# Patient Record
Sex: MALE | Race: WHITE | HISPANIC OR LATINO
[De-identification: names, ages, dates, MRNs, and addresses within clinical notes are randomized per-mention and may not be internally consistent; named-entity substitution may affect disease eponyms.]

---

## 2018-10-19 ENCOUNTER — TRANSCRIPTION ENCOUNTER (OUTPATIENT)
Age: 33
End: 2018-10-19

## 2019-05-02 ENCOUNTER — APPOINTMENT (OUTPATIENT)
Dept: OBGYN | Facility: CLINIC | Age: 34
End: 2019-05-02

## 2019-07-03 ENCOUNTER — LABORATORY RESULT (OUTPATIENT)
Age: 34
End: 2019-07-03

## 2019-07-03 ENCOUNTER — APPOINTMENT (OUTPATIENT)
Dept: OBGYN | Facility: CLINIC | Age: 34
End: 2019-07-03
Payer: COMMERCIAL

## 2019-07-03 PROCEDURE — 36415 COLL VENOUS BLD VENIPUNCTURE: CPT

## 2020-06-11 ENCOUNTER — NON-APPOINTMENT (OUTPATIENT)
Age: 35
End: 2020-06-11

## 2020-06-11 ENCOUNTER — APPOINTMENT (OUTPATIENT)
Dept: FAMILY MEDICINE | Facility: CLINIC | Age: 35
End: 2020-06-11
Payer: COMMERCIAL

## 2020-06-11 VITALS
HEART RATE: 88 BPM | WEIGHT: 222 LBS | SYSTOLIC BLOOD PRESSURE: 128 MMHG | TEMPERATURE: 98.9 F | DIASTOLIC BLOOD PRESSURE: 90 MMHG | HEIGHT: 71 IN | BODY MASS INDEX: 31.08 KG/M2 | OXYGEN SATURATION: 98 %

## 2020-06-11 VITALS — DIASTOLIC BLOOD PRESSURE: 100 MMHG | SYSTOLIC BLOOD PRESSURE: 138 MMHG

## 2020-06-11 DIAGNOSIS — Z83.3 FAMILY HISTORY OF DIABETES MELLITUS: ICD-10-CM

## 2020-06-11 DIAGNOSIS — Z78.9 OTHER SPECIFIED HEALTH STATUS: ICD-10-CM

## 2020-06-11 DIAGNOSIS — Z20.828 CONTACT WITH AND (SUSPECTED) EXPOSURE TO OTHER VIRAL COMMUNICABLE DISEASES: ICD-10-CM

## 2020-06-11 DIAGNOSIS — Z13.21 ENCOUNTER FOR SCREENING FOR NUTRITIONAL DISORDER: ICD-10-CM

## 2020-06-11 DIAGNOSIS — Z80.9 FAMILY HISTORY OF MALIGNANT NEOPLASM, UNSPECIFIED: ICD-10-CM

## 2020-06-11 PROCEDURE — 93000 ELECTROCARDIOGRAM COMPLETE: CPT | Mod: 59

## 2020-06-11 PROCEDURE — 36415 COLL VENOUS BLD VENIPUNCTURE: CPT

## 2020-06-11 PROCEDURE — 99205 OFFICE O/P NEW HI 60 MIN: CPT | Mod: 25

## 2020-06-11 NOTE — HISTORY OF PRESENT ILLNESS
[de-identified] : NP CPE \par as above +ve FAST \par reports 2 yr hx of nocturnal sweating,  sweating through clothes awoken in middle of night,  pt frequently has an alcoholic beverage\par to help him sleep in middle of night.   Drinking "10 beers" at night after work to help feel asleep  \par Reports CP and/or SOB in am associated c increased stress/anxiety, mild dizziness in am secondary to "hang over" from increased ETOH intake at night no palpitations \par pt also c/o RUQ abdominal discomfort.  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

## 2020-06-11 NOTE — PHYSICAL EXAM
[No Acute Distress] : no acute distress [EOMI] : extraocular movements intact [Well Developed] : well developed [Well Nourished] : well nourished [No JVD] : no jugular venous distention [Normal Outer Ear/Nose] : the outer ears and nose were normal in appearance [No Respiratory Distress] : no respiratory distress  [No Accessory Muscle Use] : no accessory muscle use [Normal Rate] : normal rate  [Clear to Auscultation] : lungs were clear to auscultation bilaterally [Regular Rhythm] : with a regular rhythm [Normal S1, S2] : normal S1 and S2 [No Edema] : there was no peripheral edema [No CVA Tenderness] : no CVA  tenderness [Non-distended] : non-distended [No Rash] : no rash [Grossly Normal Strength/Tone] : grossly normal strength/tone [Coordination Grossly Intact] : coordination grossly intact [Normal Gait] : normal gait [Speech Grossly Normal] : speech grossly normal [Normal Affect] : the affect was normal [Normal Insight/Judgement] : insight and judgment were intact [de-identified] : tic like movements of head/face/neck

## 2020-06-11 NOTE — ASSESSMENT
[FreeTextEntry1] : EKG performed: SR at 83 bpm, no ST/T changes \par \par see lab orders \par see referral \par \par see med orders  (Trial of Lexapro 10mg qd ) \par f/u 4 weeks \par \par

## 2020-06-11 NOTE — REVIEW OF SYSTEMS
[Negative] : Heme/Lymph [FreeTextEntry2] : see HPI  [FreeTextEntry5] : see HPI  [FreeTextEntry6] : see HPI  [FreeTextEntry7] : see HPI  [de-identified] : see HPI

## 2020-06-12 LAB
25(OH)D3 SERPL-MCNC: 23.2 NG/ML
ALBUMIN SERPL ELPH-MCNC: 5.2 G/DL
ALP BLD-CCNC: 76 U/L
ALT SERPL-CCNC: 457 U/L
AMYLASE/CREAT SERPL: 51 U/L
ANION GAP SERPL CALC-SCNC: 18 MMOL/L
APPEARANCE: CLEAR
AST SERPL-CCNC: 238 U/L
BACTERIA: NEGATIVE
BASOPHILS # BLD AUTO: 0.06 K/UL
BASOPHILS NFR BLD AUTO: 0.7 %
BILIRUB SERPL-MCNC: 1 MG/DL
BILIRUBIN URINE: NEGATIVE
BLOOD URINE: NORMAL
BUN SERPL-MCNC: 15 MG/DL
CALCIUM SERPL-MCNC: 10.1 MG/DL
CHLORIDE SERPL-SCNC: 101 MMOL/L
CHOLEST SERPL-MCNC: 291 MG/DL
CHOLEST/HDLC SERPL: 5.5 RATIO
CO2 SERPL-SCNC: 21 MMOL/L
COLOR: YELLOW
CREAT SERPL-MCNC: 0.86 MG/DL
EOSINOPHIL # BLD AUTO: 0.25 K/UL
EOSINOPHIL NFR BLD AUTO: 3 %
ESTIMATED AVERAGE GLUCOSE: 88 MG/DL
GLUCOSE QUALITATIVE U: NEGATIVE
GLUCOSE SERPL-MCNC: 101 MG/DL
GRANULAR CASTS: 1 /LPF
HBA1C MFR BLD HPLC: 4.7 %
HCT VFR BLD CALC: 51.9 %
HDLC SERPL-MCNC: 53 MG/DL
HGB BLD-MCNC: 16.7 G/DL
HYALINE CASTS: 2 /LPF
IMM GRANULOCYTES NFR BLD AUTO: 0.2 %
KETONES URINE: ABNORMAL
LDLC SERPL CALC-MCNC: 207 MG/DL
LEUKOCYTE ESTERASE URINE: NEGATIVE
LPL SERPL-CCNC: 34 U/L
LYMPHOCYTES # BLD AUTO: 2.13 K/UL
LYMPHOCYTES NFR BLD AUTO: 26 %
MAN DIFF?: NORMAL
MCHC RBC-ENTMCNC: 32.2 GM/DL
MCHC RBC-ENTMCNC: 33.2 PG
MCV RBC AUTO: 103.2 FL
MICROSCOPIC-UA: NORMAL
MONOCYTES # BLD AUTO: 0.57 K/UL
MONOCYTES NFR BLD AUTO: 7 %
NEUTROPHILS # BLD AUTO: 5.17 K/UL
NEUTROPHILS NFR BLD AUTO: 63.1 %
NITRITE URINE: NEGATIVE
PH URINE: 5.5
PLATELET # BLD AUTO: 224 K/UL
POTASSIUM SERPL-SCNC: 4.2 MMOL/L
PROT SERPL-MCNC: 8 G/DL
PROTEIN URINE: ABNORMAL
RBC # BLD: 5.03 M/UL
RBC # FLD: 11.5 %
RED BLOOD CELLS URINE: 9 /HPF
SARS-COV-2 IGG SERPL IA-ACNC: 0.01 INDEX
SARS-COV-2 IGG SERPL QL IA: NEGATIVE
SODIUM SERPL-SCNC: 141 MMOL/L
SPECIFIC GRAVITY URINE: 1.03
SQUAMOUS EPITHELIAL CELLS: 1 /HPF
T3 SERPL-MCNC: 131 NG/DL
T4 SERPL-MCNC: 5.5 UG/DL
TRIGL SERPL-MCNC: 157 MG/DL
TSH SERPL-ACNC: 1.4 UIU/ML
URATE SERPL-MCNC: 7.3 MG/DL
UROBILINOGEN URINE: NORMAL
WBC # FLD AUTO: 8.2 K/UL
WHITE BLOOD CELLS URINE: 2 /HPF

## 2020-06-16 LAB
HBV CORE IGG+IGM SER QL: NONREACTIVE
HBV CORE IGM SER QL: NONREACTIVE
HBV SURFACE AB SER QL: NONREACTIVE
HBV SURFACE AG SER QL: NONREACTIVE
HCV AB SER QL: NONREACTIVE
HCV S/CO RATIO: 0.15 S/CO

## 2020-07-19 ENCOUNTER — OUTPATIENT (OUTPATIENT)
Dept: OUTPATIENT SERVICES | Facility: HOSPITAL | Age: 35
LOS: 1 days | End: 2020-07-19
Payer: COMMERCIAL

## 2020-07-19 ENCOUNTER — APPOINTMENT (OUTPATIENT)
Dept: ULTRASOUND IMAGING | Facility: CLINIC | Age: 35
End: 2020-07-19
Payer: COMMERCIAL

## 2020-07-19 DIAGNOSIS — Z00.00 ENCOUNTER FOR GENERAL ADULT MEDICAL EXAMINATION WITHOUT ABNORMAL FINDINGS: ICD-10-CM

## 2020-07-19 DIAGNOSIS — R79.89 OTHER SPECIFIED ABNORMAL FINDINGS OF BLOOD CHEMISTRY: ICD-10-CM

## 2020-07-19 PROCEDURE — 76700 US EXAM ABDOM COMPLETE: CPT | Mod: 26

## 2020-07-22 ENCOUNTER — APPOINTMENT (OUTPATIENT)
Dept: FAMILY MEDICINE | Facility: CLINIC | Age: 35
End: 2020-07-22
Payer: COMMERCIAL

## 2020-07-22 VITALS
HEIGHT: 71 IN | OXYGEN SATURATION: 98 % | WEIGHT: 226 LBS | BODY MASS INDEX: 31.64 KG/M2 | TEMPERATURE: 98.4 F | HEART RATE: 78 BPM | SYSTOLIC BLOOD PRESSURE: 130 MMHG | DIASTOLIC BLOOD PRESSURE: 86 MMHG

## 2020-07-22 DIAGNOSIS — R31.9 HEMATURIA, UNSPECIFIED: ICD-10-CM

## 2020-07-22 PROCEDURE — 36415 COLL VENOUS BLD VENIPUNCTURE: CPT

## 2020-07-22 PROCEDURE — 99214 OFFICE O/P EST MOD 30 MIN: CPT | Mod: 25

## 2020-07-23 LAB
ALBUMIN SERPL ELPH-MCNC: 5.3 G/DL
ALP BLD-CCNC: 85 U/L
ALT SERPL-CCNC: 367 U/L
ANION GAP SERPL CALC-SCNC: 19 MMOL/L
AST SERPL-CCNC: 201 U/L
BASOPHILS # BLD AUTO: 0.04 K/UL
BASOPHILS NFR BLD AUTO: 0.7 %
BILIRUB SERPL-MCNC: 0.6 MG/DL
BUN SERPL-MCNC: 14 MG/DL
CALCIUM SERPL-MCNC: 9.9 MG/DL
CHLORIDE SERPL-SCNC: 99 MMOL/L
CO2 SERPL-SCNC: 21 MMOL/L
CREAT SERPL-MCNC: 0.81 MG/DL
EOSINOPHIL # BLD AUTO: 0.23 K/UL
EOSINOPHIL NFR BLD AUTO: 4 %
GLUCOSE SERPL-MCNC: 87 MG/DL
HCT VFR BLD CALC: 49.7 %
HGB BLD-MCNC: 16 G/DL
IMM GRANULOCYTES NFR BLD AUTO: 0.3 %
LYMPHOCYTES # BLD AUTO: 1.92 K/UL
LYMPHOCYTES NFR BLD AUTO: 33.3 %
MAN DIFF?: NORMAL
MCHC RBC-ENTMCNC: 32.2 GM/DL
MCHC RBC-ENTMCNC: 33.3 PG
MCV RBC AUTO: 103.3 FL
MONOCYTES # BLD AUTO: 0.39 K/UL
MONOCYTES NFR BLD AUTO: 6.8 %
NEUTROPHILS # BLD AUTO: 3.17 K/UL
NEUTROPHILS NFR BLD AUTO: 54.9 %
PLATELET # BLD AUTO: 218 K/UL
POTASSIUM SERPL-SCNC: 4.8 MMOL/L
PROT SERPL-MCNC: 8 G/DL
RBC # BLD: 4.81 M/UL
RBC # FLD: 11.2 %
SODIUM SERPL-SCNC: 139 MMOL/L
WBC # FLD AUTO: 5.77 K/UL

## 2020-07-23 NOTE — PHYSICAL EXAM
[No Acute Distress] : no acute distress [Well Nourished] : well nourished [Well Developed] : well developed [EOMI] : extraocular movements intact [Normal Outer Ear/Nose] : the outer ears and nose were normal in appearance [No JVD] : no jugular venous distention [No Respiratory Distress] : no respiratory distress  [No Accessory Muscle Use] : no accessory muscle use [Clear to Auscultation] : lungs were clear to auscultation bilaterally [Normal Rate] : normal rate  [Regular Rhythm] : with a regular rhythm [Normal S1, S2] : normal S1 and S2 [No Edema] : there was no peripheral edema [Non-distended] : non-distended [No CVA Tenderness] : no CVA  tenderness [Grossly Normal Strength/Tone] : grossly normal strength/tone [No Rash] : no rash [Coordination Grossly Intact] : coordination grossly intact [Normal Gait] : normal gait [Speech Grossly Normal] : speech grossly normal [Normal Affect] : the affect was normal [Normal Insight/Judgement] : insight and judgment were intact [de-identified] : tic like movements of head/face/neck

## 2020-07-23 NOTE — HISTORY OF PRESENT ILLNESS
[FreeTextEntry1] : follow up on anxiety  [de-identified] : 34 yo  male for f/u on GWEN/Insomnia/Elevated LFT and excessive ETOH intake.  pt states self d/c'ed Lexapro after 3 days bc "it was not working"  "It did not put me to sleep"   Increased dose from 5 to 10mg on Day 2 as opposed to instructed increased after 1 week. \par pt d/c'ed ETOH intake cold turkey (drinking "4-5 28 oz beers every night") and states "lexapro kept me up and did not put me to sleep." \par \par pt returned to usual consumption of ETOH after 3 days.  pt only drinks at bedtime,  pt c markedly increased stress/anxiety

## 2020-07-23 NOTE — ASSESSMENT
[FreeTextEntry1] : Detailed instruction given to pt on how to properly taper ETOH use and how to appropriately start Lexapro treatment.  Pt willing to attempt treatment recommendation for a 2nd time\par \par see lab orders \par \par close monitoring!!!!!!\par \par f/u in 2 weeks

## 2020-08-05 LAB
APPEARANCE: CLEAR
BACTERIA UR CULT: NORMAL
BACTERIA: NEGATIVE
BILIRUBIN URINE: NEGATIVE
BLOOD URINE: NEGATIVE
CALCIUM OXALATE CRYSTALS: ABNORMAL
COLOR: YELLOW
GLUCOSE QUALITATIVE U: NEGATIVE
HYALINE CASTS: 0 /LPF
KETONES URINE: ABNORMAL
LEUKOCYTE ESTERASE URINE: NEGATIVE
MICROSCOPIC-UA: NORMAL
NITRITE URINE: NEGATIVE
PH URINE: 6
PROTEIN URINE: NORMAL
RED BLOOD CELLS URINE: 0 /HPF
SPECIFIC GRAVITY URINE: 1.03
SQUAMOUS EPITHELIAL CELLS: 0 /HPF
UROBILINOGEN URINE: NORMAL
WHITE BLOOD CELLS URINE: 1 /HPF

## 2020-09-03 ENCOUNTER — APPOINTMENT (OUTPATIENT)
Dept: FAMILY MEDICINE | Facility: CLINIC | Age: 35
End: 2020-09-03
Payer: COMMERCIAL

## 2020-09-03 VITALS
BODY MASS INDEX: 31.5 KG/M2 | WEIGHT: 225 LBS | DIASTOLIC BLOOD PRESSURE: 90 MMHG | HEIGHT: 71 IN | TEMPERATURE: 98 F | HEART RATE: 73 BPM | OXYGEN SATURATION: 98 % | SYSTOLIC BLOOD PRESSURE: 126 MMHG

## 2020-09-03 DIAGNOSIS — R51 HEADACHE: ICD-10-CM

## 2020-09-03 DIAGNOSIS — R79.89 OTHER SPECIFIED ABNORMAL FINDINGS OF BLOOD CHEMISTRY: ICD-10-CM

## 2020-09-03 DIAGNOSIS — R71.8 OTHER ABNORMALITY OF RED BLOOD CELLS: ICD-10-CM

## 2020-09-03 DIAGNOSIS — R20.2 PARESTHESIA OF SKIN: ICD-10-CM

## 2020-09-03 PROCEDURE — 99214 OFFICE O/P EST MOD 30 MIN: CPT | Mod: 25

## 2020-09-03 PROCEDURE — 36415 COLL VENOUS BLD VENIPUNCTURE: CPT

## 2020-09-03 NOTE — PLAN
[FreeTextEntry1] : see radiology orders \par \par see lab orders \par \par Psych consult c Dr. Mehta STRONGLY advised \par \par \par \par restart Lexapro 5mg qd  x 2 weeks.    RTO in 2 weeks

## 2020-09-03 NOTE — HISTORY OF PRESENT ILLNESS
[de-identified] : 34 yo male for f/u on elevated LFTs/GWEN \par \par pt states  stopped recommended Lexapro after  2 days because of R UE pain/paresthesias  and R sided head pain \par \par pt states ETOH consumption is unchanged    "10 beers/night"

## 2020-09-10 LAB
ALBUMIN SERPL ELPH-MCNC: 5.1 G/DL
ALP BLD-CCNC: 81 U/L
ALT SERPL-CCNC: 382 U/L
ANION GAP SERPL CALC-SCNC: 17 MMOL/L
AST SERPL-CCNC: 204 U/L
BASOPHILS # BLD AUTO: 0.04 K/UL
BASOPHILS NFR BLD AUTO: 0.8 %
BILIRUB SERPL-MCNC: 0.6 MG/DL
BUN SERPL-MCNC: 13 MG/DL
CALCIUM SERPL-MCNC: 9.9 MG/DL
CHLORIDE SERPL-SCNC: 103 MMOL/L
CO2 SERPL-SCNC: 21 MMOL/L
CREAT SERPL-MCNC: 0.82 MG/DL
EOSINOPHIL # BLD AUTO: 0.29 K/UL
EOSINOPHIL NFR BLD AUTO: 5.5 %
GLUCOSE SERPL-MCNC: 92 MG/DL
HCT VFR BLD CALC: 48.5 %
HGB BLD-MCNC: 15.5 G/DL
IMM GRANULOCYTES NFR BLD AUTO: 0.2 %
LYMPHOCYTES # BLD AUTO: 2.08 K/UL
LYMPHOCYTES NFR BLD AUTO: 39.5 %
MAN DIFF?: NORMAL
MCHC RBC-ENTMCNC: 32 GM/DL
MCHC RBC-ENTMCNC: 32.8 PG
MCV RBC AUTO: 102.5 FL
MONOCYTES # BLD AUTO: 0.32 K/UL
MONOCYTES NFR BLD AUTO: 6.1 %
NEUTROPHILS # BLD AUTO: 2.53 K/UL
NEUTROPHILS NFR BLD AUTO: 47.9 %
PLATELET # BLD AUTO: 230 K/UL
POTASSIUM SERPL-SCNC: 4.1 MMOL/L
PROT SERPL-MCNC: 7.8 G/DL
RBC # BLD: 4.73 M/UL
RBC # FLD: 11.1 %
SODIUM SERPL-SCNC: 141 MMOL/L
WBC # FLD AUTO: 5.27 K/UL

## 2020-09-17 ENCOUNTER — APPOINTMENT (OUTPATIENT)
Dept: MRI IMAGING | Facility: CLINIC | Age: 35
End: 2020-09-17

## 2020-09-17 PROCEDURE — 70551 MRI BRAIN STEM W/O DYE: CPT | Mod: 26

## 2020-09-17 PROCEDURE — 70551 MRI BRAIN STEM W/O DYE: CPT

## 2020-09-17 PROCEDURE — 76700 US EXAM ABDOM COMPLETE: CPT

## 2020-10-01 ENCOUNTER — TRANSCRIPTION ENCOUNTER (OUTPATIENT)
Age: 35
End: 2020-10-01

## 2020-11-21 ENCOUNTER — TRANSCRIPTION ENCOUNTER (OUTPATIENT)
Age: 35
End: 2020-11-21

## 2020-11-25 ENCOUNTER — APPOINTMENT (OUTPATIENT)
Dept: GASTROENTEROLOGY | Facility: CLINIC | Age: 35
End: 2020-11-25
Payer: COMMERCIAL

## 2020-11-25 PROCEDURE — 99443: CPT

## 2020-11-25 NOTE — ASSESSMENT
[FreeTextEntry1] : I discussed with the patient and his fatty liver and abnormal liver chemistries are most likely secondary to his alcohol abuse. I advised him not to drink for 3 weeks and then repeat his liver chemistries to see if they have improved. If he still has persistent abnormalities he may need further evaluation including serological evaluation for other forms of liver disease as well asafibroscan o see if he has fibrosis or even cirrhosis. He will followup in person after the blood work is accomplished.

## 2020-11-25 NOTE — REASON FOR VISIT
[Consultation] : a consultation visit [Home] : at home, [unfilled] , at the time of the visit. [Medical Office: ([facility])___] : at the medical office located in  [Verbal consent obtained from patient] : the patient, [unfilled]

## 2020-11-25 NOTE — HISTORY OF PRESENT ILLNESS
[de-identified] : patient with several month history of vague left and right upper quadrant discomfort not specifically related to meals not associated nausea vomiting fever or chills. He has no rectal bleeding or hematemesis or melena.Patient has had several blood tests since lastJune which have shown elevated aminotransferases in the 200s. A sonogram showed a fatty liver. Patient does drink about 8 beers per day Apparently he stopped 5 days ago but at the time when he developed upper respiratory symptoms and went for Covid testing yesterday.

## 2020-12-09 ENCOUNTER — TRANSCRIPTION ENCOUNTER (OUTPATIENT)
Age: 35
End: 2020-12-09

## 2020-12-21 ENCOUNTER — EMERGENCY (EMERGENCY)
Facility: HOSPITAL | Age: 35
LOS: 1 days | Discharge: HOME | End: 2020-12-21
Attending: EMERGENCY MEDICINE
Payer: COMMERCIAL

## 2020-12-21 VITALS
OXYGEN SATURATION: 98 % | RESPIRATION RATE: 18 BRPM | DIASTOLIC BLOOD PRESSURE: 88 MMHG | SYSTOLIC BLOOD PRESSURE: 152 MMHG | HEIGHT: 71 IN | WEIGHT: 225.09 LBS | TEMPERATURE: 99 F | HEART RATE: 96 BPM

## 2020-12-21 PROCEDURE — 99283 EMERGENCY DEPT VISIT LOW MDM: CPT | Mod: 25

## 2020-12-21 PROCEDURE — 12002 RPR S/N/AX/GEN/TRNK2.6-7.5CM: CPT

## 2020-12-21 PROCEDURE — 90471 IMMUNIZATION ADMIN: CPT

## 2020-12-21 PROCEDURE — 90715 TDAP VACCINE 7 YRS/> IM: CPT

## 2020-12-21 RX ORDER — TETANUS TOXOID, REDUCED DIPHTHERIA TOXOID AND ACELLULAR PERTUSSIS VACCINE, ADSORBED 5; 2.5; 8; 8; 2.5 [IU]/.5ML; [IU]/.5ML; UG/.5ML; UG/.5ML; UG/.5ML
0.5 SUSPENSION INTRAMUSCULAR ONCE
Refills: 0 | Status: COMPLETED | OUTPATIENT
Start: 2020-12-21 | End: 2020-12-21

## 2020-12-21 RX ADMIN — TETANUS TOXOID, REDUCED DIPHTHERIA TOXOID AND ACELLULAR PERTUSSIS VACCINE, ADSORBED 0.5 MILLILITER(S): 5; 2.5; 8; 8; 2.5 SUSPENSION INTRAMUSCULAR at 21:12

## 2020-12-21 NOTE — ED PROVIDER NOTE - NSFOLLOWUPINSTRUCTIONS_ED_ALL_ED_FT
RETURN TO THE EMERGENCY ROOM IN 10-14 DAYS FOR SUTURE REMOVAL, OR FOLLOW UP WITH YOUR PRIMARY CARE DOCTOR.  KEEP THE WOUND CLEAN AND DRY FOR THE NEXT DAY.  MAY APPLY TOPICAL ANTIBIOTIC (NEOSPORIN, BACITRACIN, ETC.).  TAKE TYLENOL OR IBUPROFEN AS NEEDED FOR PAIN.  RETURN TO THE EMERGENCY ROOM IMMEDIATELY FOR ANY SIGNS OF INFECTION (REDNESS, INCREASED SWELLING, PUS, FEVER, CHILLS).    ----------------------------      Finger Laceration    WHAT YOU NEED TO KNOW:    A finger laceration is a deep cut in your skin. Your blood vessels, bones, joints, tendons, or nerves may also be injured.    DISCHARGE INSTRUCTIONS:    Return to the emergency department if:   •Your wound comes apart.      •Blood soaks through your bandage.      •You have severe pain in your finger or hand.      •Your finger is pale and cold.      •You have sudden trouble moving your finger.      •Your swelling suddenly gets worse.      •You have red streaks on your skin coming from your wound.      Call your doctor or hand specialist if:   •You have new numbness or tingling.      •Your finger feels warm, looks swollen or red, and is draining pus.      •You have a fever.      •You have questions or concerns about your condition or care.      Medicines: You may need any of the following:   •Antibiotics help prevent a bacterial infection.       •Acetaminophen decreases pain and fever. It is available without a doctor's order. Ask how much to take and how often to take it. Follow directions. Read the labels of all other medicines you are using to see if they also contain acetaminophen, or ask your doctor or pharmacist. Acetaminophen can cause liver damage if not taken correctly. Do not use more than 4 grams (4,000 milligrams) total of acetaminophen in one day.       •Prescription pain medicine may be given. Ask your healthcare provider how to take this medicine safely. Some prescription pain medicines contain acetaminophen. Do not take other medicines that contain acetaminophen without talking to your healthcare provider. Too much acetaminophen may cause liver damage. Prescription pain medicine may cause constipation. Ask your healthcare provider how to prevent or treat constipation.       •Take your medicine as directed. Contact your healthcare provider if you think your medicine is not helping or if you have side effects. Tell him or her if you are allergic to any medicine. Keep a list of the medicines, vitamins, and herbs you take. Include the amounts, and when and why you take them. Bring the list or the pill bottles to follow-up visits. Carry your medicine list with you in case of an emergency.      Self-care:   •Apply ice on your finger for 15 to 20 minutes every hour or as directed. Use an ice pack, or put crushed ice in a plastic bag. Cover it with a towel before you apply it to your skin. Ice helps prevent tissue damage and decreases swelling and pain.      •Elevate your hand above the level of your heart as often as you can. This will help decrease swelling and pain. Prop your hand on pillows or blankets to keep it elevated comfortably.      •Wear your splint as directed. A splint will decrease movement and stress on your wound. The splint may help your wound heal faster. Ask your healthcare provider how to apply and remove a splint.      •Apply ointments to decrease scarring. Do not apply ointments until your healthcare provider says it is okay. You may need to wait until your wound is healed. Ask which ointment to buy and how often to use it.      Wound care:   •Do not get your wound wet until your healthcare provider says it is okay. Do not soak your hand in water. Do not go swimming until your healthcare provider says it is okay. When your healthcare provider says it is okay, carefully wash around the wound with soap and water. Let soap and water run over your wound. Gently pat the area dry or allow it to air dry.      •Change your bandages when they get wet, dirty, or after washing. Apply new, clean bandages as directed. Do not apply elastic bandages or tape too tightly. Do not put powders or lotions on your wound.      •Apply antibiotic ointment as directed. Your healthcare provider may give you antibiotic ointment to put over your wound if you have stitches. If you have Strips-Strips™ over your wound, let them dry up and fall off on their own. If they do not fall off within 14 days, gently remove them. If you have glue over your wound, do not remove or pick at it. If your glue comes off, do not replace it with glue that you have at home.      •Check your wound every day for signs of infection. Signs of infection include swelling, redness, or pus.      Follow up with your doctor or hand specialist in 2 days: Write down your questions so you remember to ask them during your visits.

## 2020-12-21 NOTE — ED PROVIDER NOTE - PHYSICAL EXAMINATION
Constitutional: Awake, alert, in no acute distress  Eyes: no scleral icterus  HENT: normocephalic, atraumatic  Pulm: non-labored respirations  Extremities: 3 cm curvilinear laceration to dorsal R 2nd finger over proximal phalanx, no decreased ROM or distal neurovascular deficit.  Skin: no rash, no jaundice  Neuro: AAOx3, moving all extremities equally

## 2020-12-21 NOTE — ED PROVIDER NOTE - OBJECTIVE STATEMENT
25y M w/ no significant PMH, presenting for R finger laceration.  Pt was reaching for something on a shelf just prior to arrival when he cut himself against a piece of metal on the wall.  Denies numbness, decreased ROM or other injury.  Last tetanus unknown.

## 2020-12-21 NOTE — ED PROVIDER NOTE - ATTENDING CONTRIBUTION TO CARE
25y M presenting for R 2nd finger laceration. cut with piece of metal Will update tetanus, repair lac.

## 2020-12-21 NOTE — ED PROVIDER NOTE - PATIENT PORTAL LINK FT
You can access the FollowMyHealth Patient Portal offered by [facility] by registering at the following website: [email or web address]. By joining [facility]’s FollowMyHealth portal, you will also be able to view your health information using other applications (apps) compatible with our system.

## 2021-01-06 ENCOUNTER — EMERGENCY (EMERGENCY)
Facility: HOSPITAL | Age: 36
LOS: 1 days | Discharge: HOME | End: 2021-01-06
Attending: EMERGENCY MEDICINE
Payer: COMMERCIAL

## 2021-01-06 VITALS
OXYGEN SATURATION: 100 % | RESPIRATION RATE: 18 BRPM | HEART RATE: 72 BPM | WEIGHT: 220.02 LBS | DIASTOLIC BLOOD PRESSURE: 99 MMHG | TEMPERATURE: 98 F | HEIGHT: 71 IN | SYSTOLIC BLOOD PRESSURE: 159 MMHG

## 2021-01-06 PROCEDURE — 99282 EMERGENCY DEPT VISIT SF MDM: CPT

## 2021-01-06 NOTE — ED PROVIDER NOTE - OBJECTIVE STATEMENT
36yo M presenting for suture removal. Had stitches placed in right 2nd finger on 12/22. No complications with sutures, no pain, no redness. Denies decreased ROM, joint swelling, redness, purulent drainage, warmth.

## 2021-01-06 NOTE — ED PROVIDER NOTE - PHYSICAL EXAMINATION
Gen: WD/WN, NAD  Head: NCAT  Cardiac: Well perfused, Radial pulses 2+  Resp: No resp distress  MSK: R hand: FROM all digits, able to make tight fist. No ttp. no joint swelling  Skin: Well-healed laceration with 8 sutures to R 2nd finger. No erythema or drainage.  Neuro: Awake, alert & oriented x 3. No focal deficits

## 2021-01-06 NOTE — ED PROVIDER NOTE - NSFOLLOWUPINSTRUCTIONS_ED_ALL_ED_FT
- Follow up with your doctor within 2-3 days.   - Return to the ED for any new or worsening symptoms including but not limited to worsening redness, swelling, pain, pus drainage, fevers, etc.  - Keep areas clean and dry  - May rinse with soap and water  - Apply bacitracin or neosporin multiple times per day      SEEK IMMEDIATE MEDICAL CARE IF YOU HAVE ANY OF THE FOLLOWING SYMPTOMS: swelling around the wound, worsening pain, drainage from the wound, red streaking going away from your wound, inability to move finger or toe near the laceration, or discoloration of skin near the laceration.

## 2021-01-06 NOTE — ED PROVIDER NOTE - PATIENT PORTAL LINK FT
You can access the FollowMyHealth Patient Portal offered by [facility] by registering at the following website: [email or web address]. By joining [facility]’s FollowMyHealth portal, you will also be able to view your health information using other applications (apps) compatible with our system.

## 2021-01-06 NOTE — ED PROVIDER NOTE - CLINICAL SUMMARY MEDICAL DECISION MAKING FREE TEXT BOX
36yo M presenting for suture removal. sutures removed, bacitracin applied. pt given wound care instructions.

## 2021-05-08 ENCOUNTER — EMERGENCY (EMERGENCY)
Facility: HOSPITAL | Age: 36
LOS: 1 days | Discharge: HOME | End: 2021-05-08
Attending: STUDENT IN AN ORGANIZED HEALTH CARE EDUCATION/TRAINING PROGRAM
Payer: COMMERCIAL

## 2021-05-08 VITALS
OXYGEN SATURATION: 98 % | HEART RATE: 68 BPM | RESPIRATION RATE: 18 BRPM | TEMPERATURE: 98 F | HEIGHT: 71 IN | SYSTOLIC BLOOD PRESSURE: 146 MMHG | DIASTOLIC BLOOD PRESSURE: 86 MMHG | WEIGHT: 220.02 LBS

## 2021-05-08 PROBLEM — F41.9 ANXIETY DISORDER, UNSPECIFIED: Chronic | Status: ACTIVE | Noted: 2020-12-21

## 2021-05-08 PROCEDURE — 99284 EMERGENCY DEPT VISIT MOD MDM: CPT

## 2021-05-08 PROCEDURE — 96372 THER/PROPH/DIAG INJ SC/IM: CPT

## 2021-05-08 PROCEDURE — 99283 EMERGENCY DEPT VISIT LOW MDM: CPT | Mod: 25

## 2021-05-08 RX ORDER — OXYCODONE AND ACETAMINOPHEN 5; 325 MG/1; MG/1
1 TABLET ORAL ONCE
Refills: 0 | Status: DISCONTINUED | OUTPATIENT
Start: 2021-05-08 | End: 2021-05-08

## 2021-05-08 RX ORDER — CYCLOBENZAPRINE HYDROCHLORIDE 10 MG/1
1 TABLET, FILM COATED ORAL
Qty: 9 | Refills: 0
Start: 2021-05-08 | End: 2021-05-10

## 2021-05-08 RX ORDER — ACETAMINOPHEN 500 MG
1 TABLET ORAL
Qty: 28 | Refills: 0
Start: 2021-05-08 | End: 2021-05-14

## 2021-05-08 RX ORDER — CYCLOBENZAPRINE HYDROCHLORIDE 10 MG/1
5 TABLET, FILM COATED ORAL ONCE
Refills: 0 | Status: COMPLETED | OUTPATIENT
Start: 2021-05-08 | End: 2021-05-08

## 2021-05-08 RX ORDER — LIDOCAINE 4 G/100G
1 CREAM TOPICAL
Qty: 5 | Refills: 0
Start: 2021-05-08 | End: 2021-05-12

## 2021-05-08 RX ORDER — KETOROLAC TROMETHAMINE 30 MG/ML
30 SYRINGE (ML) INJECTION ONCE
Refills: 0 | Status: DISCONTINUED | OUTPATIENT
Start: 2021-05-08 | End: 2021-05-08

## 2021-05-08 RX ORDER — LIDOCAINE 4 G/100G
1 CREAM TOPICAL ONCE
Refills: 0 | Status: COMPLETED | OUTPATIENT
Start: 2021-05-08 | End: 2021-05-08

## 2021-05-08 RX ADMIN — LIDOCAINE 1 PATCH: 4 CREAM TOPICAL at 06:34

## 2021-05-08 RX ADMIN — OXYCODONE AND ACETAMINOPHEN 1 TABLET(S): 5; 325 TABLET ORAL at 06:29

## 2021-05-08 RX ADMIN — CYCLOBENZAPRINE HYDROCHLORIDE 5 MILLIGRAM(S): 10 TABLET, FILM COATED ORAL at 06:29

## 2021-05-08 RX ADMIN — Medication 30 MILLIGRAM(S): at 06:30

## 2021-05-08 NOTE — ED ADULT TRIAGE NOTE - CHIEF COMPLAINT QUOTE
patient complaining of back pain radiating to right side of abdominal area denies N/V started a few days ago, unable to sleep denies injury

## 2021-05-08 NOTE — ED ADULT NURSE NOTE - OBJECTIVE STATEMENT
Pt is alert and oriented. Pt states that he has been having right back pain for 2 days. Pt states that the pain just "started out of nowhere." Pt denies sob, chest pain, nausea, vomiting and dizziness. Pt resp are even and unlabored, skin color wenceslao for race. pt educated on plan of care, pt able to successfully teach back plan of care to RN, RN will continue to reeducate pt during hospital stay.

## 2021-05-08 NOTE — ED PROVIDER NOTE - NSFOLLOWUPCLINICS_GEN_ALL_ED_FT
[facility]  Orthopedics  [address] [place] [id number]  Phone: [phone]  Fax:

## 2021-05-08 NOTE — ED PROVIDER NOTE - PATIENT PORTAL LINK FT
You can access the FollowMyHealth Patient Portal offered by [facility] by registering at the following website: [email or web address]. By joining [facility]’s FollowMyHealth portal, you will also be able to view your health information using other applications (apps) compatible with our system.

## 2021-05-08 NOTE — ED PROVIDER NOTE - NSFOLLOWUPINSTRUCTIONS_ED_ALL_ED_FT
Take Tylenol every 8 hours  take flexeril ever 6 hours   Use heating pads   Avoid heavy lifting     return if new or worsening symptoms       Back Pain    WHAT YOU NEED TO KNOW:    Back pain is common. It can be caused by many conditions, such as arthritis or the breakdown of spinal discs. Your risk for back pain is increased by injuries, lack of activity, or repeated bending and twisting. You may feel sore or stiff on one or both sides of your back. The pain may spread to your buttocks or thighs.    DISCHARGE INSTRUCTIONS:    Return to the emergency department if:     You have pain, numbness, or weakness in one or both legs.      Your pain becomes so severe that you cannot walk.      You cannot control your urine or bowel movements.      You have severe back pain with chest pain.      You have severe back pain, nausea, and vomiting.      You have severe back pain that spreads to your side or genital area.    Contact your healthcare provider if:     You have back pain that does not get better with rest and pain medicine.      You have a fever.      You have pain that worsens when you are on your back or when you rest.      You have pain that worsens when you cough or sneeze.      You lose weight without trying.      You have questions or concerns about your condition or care.    Medicines:     NSAIDs help decrease swelling and pain. This medicine is available with or without a doctor's order. NSAIDs can cause stomach bleeding or kidney problems in certain people. If you take blood thinner medicine, always ask your healthcare provider if NSAIDs are safe for you. Always read the medicine label and follow directions.      Acetaminophen decreases pain and fever. It is available without a doctor's order. Ask how much to take and how often to take it. Follow directions. Read the labels of all other medicines you are using to see if they also contain acetaminophen, or ask your doctor or pharmacist. Acetaminophen can cause liver damage if not taken correctly. Do not use more than 4 grams (4,000 milligrams) total of acetaminophen in one day.       Muscle relaxers help decrease muscle spasms and back pain.      Prescription pain medicine may be given. Ask your healthcare provider how to take this medicine safely. Some prescription pain medicines contain acetaminophen. Do not take other medicines that contain acetaminophen without talking to your healthcare provider. Too much acetaminophen may cause liver damage. Prescription pain medicine may cause constipation. Ask your healthcare provider how to prevent or treat constipation.       Take your medicine as directed. Contact your healthcare provider if you think your medicine is not helping or if you have side effects. Tell him or her if you are allergic to any medicine. Keep a list of the medicines, vitamins, and herbs you take. Include the amounts, and when and why you take them. Bring the list or the pill bottles to follow-up visits. Carry your medicine list with you in case of an emergency.    How to manage your back pain:     Apply ice on your back for 15 to 20 minutes every hour or as directed. Use an ice pack, or put crushed ice in a plastic bag. Cover it with a towel before you apply it to your skin. Ice helps prevent tissue damage and decreases pain.      Apply heat on your back for 20 to 30 minutes every 2 hours for as many days as directed. Heat helps decrease pain and muscle spasms.      Stay active as much as you can without causing more pain. Bed rest could make your back pain worse. Avoid heavy lifting until your pain is gone.      Go to physical therapy as directed. A physical therapist can teach you exercises to help improve movement and strength, and to decrease pain.    Follow up with your healthcare provider in 2 weeks, or as directed: Write down your questions so you remember to ask them during your visits.

## 2021-05-08 NOTE — ED PROVIDER NOTE - OBJECTIVE STATEMENT
36 year old male with no pmhx presents c/o back pain. Pt states sharp back pain, worse with movement began 3 days ago, he was sitting down when he first noticed it, radiates to right side. he has not been taking medications for the pain. he work in a restaurant making pizza. denies heavy lifting, injury, inciting event, fever/chills, dysuria, hematuria, incontinence and saddle anesthesia. denies pain associated with eating.

## 2021-05-08 NOTE — ED PROVIDER NOTE - ATTENDING CONTRIBUTION TO CARE
pt with lower back pain for 3 days. right sided did not take any meds for the pain   Const: Awake, alert and oriented. In no acute distress. Well appearing.  HEENT: NC/AT. Moist mucous membranes.  Eyes: No scleral icterus. EOMI.  Neck:. Soft and supple. Full ROM without pain.  Cardiac: Regular rate and regular rhythm. +S1/S2. Peripheral pulses 2+ and symmetric. No LE edema.  Resp: Speaking in full sentences. No evidence of respiratory distress. No wheezes, rales or rhonchi.  Abd: Soft, non-tender, non-distended. Normal bowel sounds in all 4 quadrants. No guarding or rebound.  Back: Spine midline and non-tender. No CVAT. right paralumbar ttp +spasm  Skin: No rashes, abrasions or lacerations.  Lymph: No cervical lymphadenopathy.  Neuro: Awake, alert & oriented x 3. Moves all extremities symmetrically. A&Ox3, moving all extremities symmetrically, follows commands, motor robyn upper and lower ext 5/5, sensory symm and intact CN 2-12 grossly intact, no ataxia, no nystagmus, no dysmetria, no ddk, symm robyn, no pronator drift  Pt denies bladder/bowel incontinence. saddle anesthesia, neuro deficits. No midline spine tenderness, no step offs. + Paraspinal ttp. Likely msk pain, analgesia, reassess

## 2021-05-08 NOTE — ED PROVIDER NOTE - PHYSICAL EXAMINATION
ambulatory with normal gait, no midline tenderness, negative CVA tenderness, negative murphys sign   + tenderness to right thoracic paraspinal muscles

## 2021-05-08 NOTE — ED PROVIDER NOTE - NS ED MD EM SELECTION
Pt admitted to [address] in stable condition. Oriented to environment. Admission questions complete. Initial assessment complete. Refuses skin assessment. POC reviewed. No needs voiced at this time. Call light in reach. Pulmonology consulted. Will cont to monitor.  Veronica Martin [id number] Detailed

## 2021-05-11 ENCOUNTER — APPOINTMENT (OUTPATIENT)
Dept: FAMILY MEDICINE | Facility: CLINIC | Age: 36
End: 2021-05-11

## 2021-05-24 ENCOUNTER — APPOINTMENT (OUTPATIENT)
Dept: FAMILY MEDICINE | Facility: CLINIC | Age: 36
End: 2021-05-24
Payer: COMMERCIAL

## 2021-05-24 ENCOUNTER — NON-APPOINTMENT (OUTPATIENT)
Age: 36
End: 2021-05-24

## 2021-05-24 VITALS
TEMPERATURE: 97.7 F | WEIGHT: 214 LBS | OXYGEN SATURATION: 98 % | SYSTOLIC BLOOD PRESSURE: 118 MMHG | HEIGHT: 71 IN | DIASTOLIC BLOOD PRESSURE: 82 MMHG | HEART RATE: 75 BPM | BODY MASS INDEX: 29.96 KG/M2

## 2021-05-24 VITALS — DIASTOLIC BLOOD PRESSURE: 70 MMHG | SYSTOLIC BLOOD PRESSURE: 118 MMHG

## 2021-05-24 DIAGNOSIS — Z13.29 ENCOUNTER FOR SCREENING FOR OTHER SUSPECTED ENDOCRINE DISORDER: ICD-10-CM

## 2021-05-24 DIAGNOSIS — Z13.220 ENCOUNTER FOR SCREENING FOR LIPOID DISORDERS: ICD-10-CM

## 2021-05-24 DIAGNOSIS — G47.00 INSOMNIA, UNSPECIFIED: ICD-10-CM

## 2021-05-24 DIAGNOSIS — Z92.29 PERSONAL HISTORY OF OTHER DRUG THERAPY: ICD-10-CM

## 2021-05-24 DIAGNOSIS — Z00.00 ENCOUNTER FOR GENERAL ADULT MEDICAL EXAMINATION W/OUT ABNORMAL FINDINGS: ICD-10-CM

## 2021-05-24 DIAGNOSIS — Z13.1 ENCOUNTER FOR SCREENING FOR DIABETES MELLITUS: ICD-10-CM

## 2021-05-24 DIAGNOSIS — R53.83 OTHER FATIGUE: ICD-10-CM

## 2021-05-24 PROCEDURE — 93000 ELECTROCARDIOGRAM COMPLETE: CPT

## 2021-05-24 PROCEDURE — 99395 PREV VISIT EST AGE 18-39: CPT | Mod: 25

## 2021-05-24 NOTE — PHYSICAL EXAM

## 2021-05-24 NOTE — HISTORY OF PRESENT ILLNESS
[FreeTextEntry1] : CPE. [de-identified] : CPE\par as above, +ve FAST, no CP/SOB c activity no dizziness no palpitations \par no  N/V/D +BM qd NL no bloody/black stools \par no urinary complaints  \par \par Occ Hx: [employer]  \par \par pt states has NOT drank ETOH in last 4 days , states taking Lexapro 10mg "as needed for sleep."

## 2021-05-25 LAB
25(OH)D3 SERPL-MCNC: 16.3 NG/ML
ALBUMIN SERPL ELPH-MCNC: 5.2 G/DL
ALP BLD-CCNC: 67 U/L
ALT SERPL-CCNC: 205 U/L
ANION GAP SERPL CALC-SCNC: 15 MMOL/L
APPEARANCE: CLEAR
AST SERPL-CCNC: 130 U/L
BACTERIA: NEGATIVE
BASOPHILS # BLD AUTO: 0.04 K/UL
BASOPHILS NFR BLD AUTO: 0.5 %
BILIRUB SERPL-MCNC: 0.7 MG/DL
BILIRUBIN URINE: NEGATIVE
BLOOD URINE: NORMAL
BUN SERPL-MCNC: 12 MG/DL
CALCIUM SERPL-MCNC: 9.9 MG/DL
CHLORIDE SERPL-SCNC: 106 MMOL/L
CHOLEST SERPL-MCNC: 201 MG/DL
CO2 SERPL-SCNC: 19 MMOL/L
COLOR: YELLOW
COVID-19 SPIKE DOMAIN ANTIBODY INTERPRETATION: POSITIVE
CREAT SERPL-MCNC: 0.83 MG/DL
EOSINOPHIL # BLD AUTO: 0.32 K/UL
EOSINOPHIL NFR BLD AUTO: 4.2 %
ESTIMATED AVERAGE GLUCOSE: 85 MG/DL
FOLATE SERPL-MCNC: 17 NG/ML
GGT SERPL-CCNC: 250 U/L
GLUCOSE QUALITATIVE U: NEGATIVE
GLUCOSE SERPL-MCNC: 71 MG/DL
HBA1C MFR BLD HPLC: 4.6 %
HCT VFR BLD CALC: 45 %
HDLC SERPL-MCNC: 46 MG/DL
HGB BLD-MCNC: 14.9 G/DL
HYALINE CASTS: 2 /LPF
IMM GRANULOCYTES NFR BLD AUTO: 0.5 %
KETONES URINE: NORMAL
LDLC SERPL CALC-MCNC: 138 MG/DL
LEUKOCYTE ESTERASE URINE: NEGATIVE
LYMPHOCYTES # BLD AUTO: 2.08 K/UL
LYMPHOCYTES NFR BLD AUTO: 27.1 %
MAN DIFF?: NORMAL
MCHC RBC-ENTMCNC: 33.1 GM/DL
MCHC RBC-ENTMCNC: 33.6 PG
MCV RBC AUTO: 101.6 FL
MICROSCOPIC-UA: NORMAL
MONOCYTES # BLD AUTO: 0.43 K/UL
MONOCYTES NFR BLD AUTO: 5.6 %
NEUTROPHILS # BLD AUTO: 4.76 K/UL
NEUTROPHILS NFR BLD AUTO: 62.1 %
NITRITE URINE: NEGATIVE
NONHDLC SERPL-MCNC: 155 MG/DL
PH URINE: 5.5
PLATELET # BLD AUTO: 205 K/UL
POTASSIUM SERPL-SCNC: 4.1 MMOL/L
PROT SERPL-MCNC: 7.7 G/DL
PROTEIN URINE: NORMAL
RBC # BLD: 4.43 M/UL
RBC # FLD: 11.1 %
RED BLOOD CELLS URINE: 1 /HPF
SARS-COV-2 AB SERPL IA-ACNC: >250 U/ML
SODIUM SERPL-SCNC: 141 MMOL/L
SPECIFIC GRAVITY URINE: 1.02
SQUAMOUS EPITHELIAL CELLS: 1 /HPF
T3FREE SERPL-MCNC: 3.74 PG/ML
T4 FREE SERPL-MCNC: 0.9 NG/DL
TRIGL SERPL-MCNC: 84 MG/DL
TSH SERPL-ACNC: 0.47 UIU/ML
URATE SERPL-MCNC: 7.1 MG/DL
UROBILINOGEN URINE: NORMAL
VIT B12 SERPL-MCNC: 644 PG/ML
WBC # FLD AUTO: 7.67 K/UL
WHITE BLOOD CELLS URINE: 1 /HPF

## 2022-01-11 ENCOUNTER — APPOINTMENT (OUTPATIENT)
Dept: FAMILY MEDICINE | Facility: CLINIC | Age: 37
End: 2022-01-11
Payer: COMMERCIAL

## 2022-01-11 DIAGNOSIS — J20.9 ACUTE BRONCHITIS, UNSPECIFIED: ICD-10-CM

## 2022-01-11 PROCEDURE — 99213 OFFICE O/P EST LOW 20 MIN: CPT | Mod: 95

## 2022-01-11 NOTE — HISTORY OF PRESENT ILLNESS
[Home] : at home, [unfilled] , at the time of the visit. [Medical Office: ([facility])___] : at the medical office located in  [Verbal consent obtained from patient] : the patient, [unfilled] [FreeTextEntry8] : 37 yo male presents with complaint of nonproductive cough, he says its been going on for 2 wks. He was initially having nasal congestion but says that has resolved. He was tested for covid 2 wks ago when it started but it was negative. Denies fever, chills, cp, palpitations, sob, nv, heat/cold intolerance, dizziness, melena, hematochezia, muscle weakness, loss of sensation, bowel/bladder incontinence or calf pain.\par

## 2022-01-11 NOTE — ASSESSMENT
[FreeTextEntry1] : Acute bronchitis: start azithromycin, start tessalon perles prn for cough, covid test negative, recommend supportive care, recommend increased hydration, call EMS if symptoms worsen or develop sob. Recommend hand hygiene, cover mouth when coughing, wash hands frequently\par RTC 1 wk

## 2023-03-17 ENCOUNTER — APPOINTMENT (OUTPATIENT)
Dept: FAMILY MEDICINE | Facility: CLINIC | Age: 38
End: 2023-03-17
Payer: COMMERCIAL

## 2023-03-17 ENCOUNTER — NON-APPOINTMENT (OUTPATIENT)
Age: 38
End: 2023-03-17

## 2023-03-17 VITALS
HEART RATE: 73 BPM | SYSTOLIC BLOOD PRESSURE: 132 MMHG | WEIGHT: 202 LBS | BODY MASS INDEX: 28.28 KG/M2 | HEIGHT: 71 IN | TEMPERATURE: 98.2 F | DIASTOLIC BLOOD PRESSURE: 90 MMHG | OXYGEN SATURATION: 98 %

## 2023-03-17 DIAGNOSIS — K76.0 FATTY (CHANGE OF) LIVER, NOT ELSEWHERE CLASSIFIED: ICD-10-CM

## 2023-03-17 DIAGNOSIS — F41.9 ANXIETY DISORDER, UNSPECIFIED: ICD-10-CM

## 2023-03-17 DIAGNOSIS — K70.9 ALCOHOLIC LIVER DISEASE, UNSPECIFIED: ICD-10-CM

## 2023-03-17 DIAGNOSIS — Z78.9 OTHER SPECIFIED HEALTH STATUS: ICD-10-CM

## 2023-03-17 DIAGNOSIS — K21.9 GASTRO-ESOPHAGEAL REFLUX DISEASE W/OUT ESOPHAGITIS: ICD-10-CM

## 2023-03-17 DIAGNOSIS — R07.89 OTHER CHEST PAIN: ICD-10-CM

## 2023-03-17 PROCEDURE — 93000 ELECTROCARDIOGRAM COMPLETE: CPT

## 2023-03-17 PROCEDURE — 99214 OFFICE O/P EST MOD 30 MIN: CPT | Mod: 25

## 2023-03-17 RX ORDER — AZITHROMYCIN 250 MG/1
250 TABLET, FILM COATED ORAL
Qty: 1 | Refills: 0 | Status: COMPLETED | COMMUNITY
Start: 2022-01-11 | End: 2023-03-17

## 2023-03-17 RX ORDER — PANTOPRAZOLE 40 MG/1
40 TABLET, DELAYED RELEASE ORAL DAILY
Qty: 30 | Refills: 1 | Status: ACTIVE | COMMUNITY
Start: 2023-03-17 | End: 1900-01-01

## 2023-03-17 RX ORDER — ESCITALOPRAM OXALATE 10 MG/1
10 TABLET ORAL
Qty: 30 | Refills: 3 | Status: ACTIVE | COMMUNITY
Start: 2020-06-11 | End: 1900-01-01

## 2023-03-17 RX ORDER — BENZONATATE 100 MG/1
100 CAPSULE ORAL 3 TIMES DAILY
Qty: 30 | Refills: 0 | Status: COMPLETED | COMMUNITY
Start: 2022-01-11 | End: 2023-03-17

## 2023-03-17 NOTE — PLAN
[FreeTextEntry1] : EKG Performed:  SR at 76 bpm, non-specific ST/T changes \par \par see lab orders  \par \par see med orders \par \par see referral (in patient Detox program followed by IOP strongly recommended) \par \par f/u pending results

## 2023-03-17 NOTE — HISTORY OF PRESENT ILLNESS
[FreeTextEntry8] : Pt. c/o chest/ mid abd  pain moves from right/left side when tossing on bed/ SOB. Pt. stated all symptoms  started this morning\par \par 37 yo male reports heavy CP and trouble breathing upon wakening this am.   pt states improved when standing, but felt weak.   \par +ve "shakiness"   ?? nausea  \par \par pt states went to sleep at 11 am last night.  pt states at 10 pm had pizza.  pt states had five 25 oz  beers (roughly the amount every night)  before 11 pm \par \par pt is single  no partner no family in this [place]

## 2023-03-22 ENCOUNTER — APPOINTMENT (OUTPATIENT)
Dept: FAMILY MEDICINE | Facility: CLINIC | Age: 38
End: 2023-03-22

## 2023-03-27 ENCOUNTER — APPOINTMENT (OUTPATIENT)
Dept: ULTRASOUND IMAGING | Facility: CLINIC | Age: 38
End: 2023-03-27
Payer: MEDICAID

## 2023-03-27 PROCEDURE — 76700 US EXAM ABDOM COMPLETE: CPT

## 2023-03-28 ENCOUNTER — APPOINTMENT (OUTPATIENT)
Dept: FAMILY MEDICINE | Facility: CLINIC | Age: 38
End: 2023-03-28

## 2023-03-30 LAB
ALBUMIN SERPL ELPH-MCNC: 4.9 G/DL
ALP BLD-CCNC: 85 U/L
ALT SERPL-CCNC: 112 U/L
ANION GAP SERPL CALC-SCNC: 14 MMOL/L
AST SERPL-CCNC: 90 U/L
BASOPHILS # BLD AUTO: 0.06 K/UL
BASOPHILS NFR BLD AUTO: 0.9 %
BILIRUB SERPL-MCNC: 0.6 MG/DL
BUN SERPL-MCNC: 14 MG/DL
CALCIUM SERPL-MCNC: 9.7 MG/DL
CHLORIDE SERPL-SCNC: 101 MMOL/L
CO2 SERPL-SCNC: 26 MMOL/L
CREAT SERPL-MCNC: 0.82 MG/DL
EGFR: 115 ML/MIN/1.73M2
EOSINOPHIL # BLD AUTO: 0.2 K/UL
EOSINOPHIL NFR BLD AUTO: 2.9 %
GLUCOSE SERPL-MCNC: 91 MG/DL
HCT VFR BLD CALC: 47.6 %
HGB BLD-MCNC: 15.1 G/DL
IMM GRANULOCYTES NFR BLD AUTO: 0.4 %
LYMPHOCYTES # BLD AUTO: 2.08 K/UL
LYMPHOCYTES NFR BLD AUTO: 30.2 %
MAN DIFF?: NORMAL
MCHC RBC-ENTMCNC: 31.7 GM/DL
MCHC RBC-ENTMCNC: 32.2 PG
MCV RBC AUTO: 101.5 FL
MONOCYTES # BLD AUTO: 0.36 K/UL
MONOCYTES NFR BLD AUTO: 5.2 %
NEUTROPHILS # BLD AUTO: 4.16 K/UL
NEUTROPHILS NFR BLD AUTO: 60.4 %
PLATELET # BLD AUTO: 249 K/UL
POTASSIUM SERPL-SCNC: 4.2 MMOL/L
PROT SERPL-MCNC: 7.5 G/DL
RBC # BLD: 4.69 M/UL
RBC # FLD: 11.2 %
SODIUM SERPL-SCNC: 140 MMOL/L
TSH SERPL-ACNC: 1.08 UIU/ML
WBC # FLD AUTO: 6.89 K/UL

## 2023-04-10 ENCOUNTER — APPOINTMENT (OUTPATIENT)
Dept: FAMILY MEDICINE | Facility: CLINIC | Age: 38
End: 2023-04-10

## 2023-11-24 NOTE — ED ADULT NURSE NOTE - DOES PATIENT HAVE ADVANCE DIRECTIVE
Gen: No acute distress, non toxic  HENT: NCAT, Mucous membranes moist, Oropharynx without exudates, uvula midline  Eyes: pink conjunctivae, EOMI, PERRL  Neuro: A&O x 3, sensorimotor intact without deficits   MSK: Mild swelling to dorsum of L foot proximal to 2-5 digits. +TTP dorsum of foot over metatarsals 3 & 4. calves symmetric, non-tender. Able to bear weight and ambulate  Vasc: DP pulses 2+ b/l  Skin: No rashes. intact and perfused.
No

## 2024-02-07 ENCOUNTER — APPOINTMENT (OUTPATIENT)
Dept: FAMILY MEDICINE | Facility: CLINIC | Age: 39
End: 2024-02-07

## 2024-12-25 PROBLEM — F10.90 ALCOHOL USE: Status: ACTIVE | Noted: 2020-06-11

## 2025-01-03 ENCOUNTER — APPOINTMENT (OUTPATIENT)
Dept: FAMILY MEDICINE | Facility: CLINIC | Age: 40
End: 2025-01-03

## 2025-01-06 ENCOUNTER — APPOINTMENT (OUTPATIENT)
Dept: FAMILY MEDICINE | Facility: CLINIC | Age: 40
End: 2025-01-06

## 2025-02-13 ENCOUNTER — APPOINTMENT (OUTPATIENT)
Dept: NEUROLOGY | Facility: CLINIC | Age: 40
End: 2025-02-13
Payer: COMMERCIAL

## 2025-02-13 VITALS
SYSTOLIC BLOOD PRESSURE: 160 MMHG | BODY MASS INDEX: 28 KG/M2 | DIASTOLIC BLOOD PRESSURE: 93 MMHG | WEIGHT: 200 LBS | HEIGHT: 71 IN | OXYGEN SATURATION: 97 % | HEART RATE: 77 BPM

## 2025-02-13 DIAGNOSIS — R51.9 HEADACHE, UNSPECIFIED: ICD-10-CM

## 2025-02-13 DIAGNOSIS — R06.89 OTHER ABNORMALITIES OF BREATHING: ICD-10-CM

## 2025-02-13 DIAGNOSIS — R20.2 PARESTHESIA OF SKIN: ICD-10-CM

## 2025-02-13 DIAGNOSIS — F10.90 ALCOHOL USE, UNSPECIFIED, UNCOMPLICATED: ICD-10-CM

## 2025-02-13 DIAGNOSIS — H93.13 TINNITUS, BILATERAL: ICD-10-CM

## 2025-02-13 PROCEDURE — 99205 OFFICE O/P NEW HI 60 MIN: CPT

## 2025-02-13 RX ORDER — ELECTROLYTES/DEXTROSE
SOLUTION, ORAL ORAL
Refills: 0 | Status: ACTIVE | COMMUNITY

## 2025-02-13 RX ORDER — MULTIVITAMIN/IRON/FOLIC ACID 18MG-0.4MG
TABLET ORAL
Refills: 0 | Status: ACTIVE | COMMUNITY

## 2025-02-27 ENCOUNTER — APPOINTMENT (OUTPATIENT)
Dept: NEUROLOGY | Facility: CLINIC | Age: 40
End: 2025-02-27
Payer: COMMERCIAL

## 2025-02-27 PROCEDURE — 95816 EEG AWAKE AND DROWSY: CPT

## 2025-02-27 PROCEDURE — 93040 RHYTHM ECG WITH REPORT: CPT

## 2025-04-14 ENCOUNTER — APPOINTMENT (OUTPATIENT)
Dept: NEUROLOGY | Facility: CLINIC | Age: 40
End: 2025-04-14

## 2025-06-11 ENCOUNTER — NON-APPOINTMENT (OUTPATIENT)
Age: 40
End: 2025-06-11

## 2025-06-19 ENCOUNTER — APPOINTMENT (OUTPATIENT)
Dept: NEUROLOGY | Facility: CLINIC | Age: 40
End: 2025-06-19
Payer: COMMERCIAL

## 2025-06-19 VITALS
HEIGHT: 71 IN | BODY MASS INDEX: 31.5 KG/M2 | SYSTOLIC BLOOD PRESSURE: 137 MMHG | DIASTOLIC BLOOD PRESSURE: 83 MMHG | OXYGEN SATURATION: 98 % | HEART RATE: 70 BPM | WEIGHT: 225 LBS

## 2025-06-19 PROCEDURE — G2211 COMPLEX E/M VISIT ADD ON: CPT

## 2025-06-19 PROCEDURE — 99215 OFFICE O/P EST HI 40 MIN: CPT

## 2025-06-19 RX ORDER — ERGOCALCIFEROL 1.25 MG/1
1.25 MG CAPSULE, LIQUID FILLED ORAL
Qty: 8 | Refills: 0 | Status: ACTIVE | COMMUNITY
Start: 2025-06-19 | End: 1900-01-01

## 2025-06-19 RX ORDER — FOLIC ACID 1 MG/1
1 TABLET ORAL
Qty: 90 | Refills: 1 | Status: ACTIVE | COMMUNITY
Start: 2025-06-19 | End: 1900-01-01

## 2025-07-14 ENCOUNTER — APPOINTMENT (OUTPATIENT)
Dept: FAMILY MEDICINE | Facility: CLINIC | Age: 40
End: 2025-07-14